# Patient Record
Sex: FEMALE | Race: WHITE | NOT HISPANIC OR LATINO | URBAN - METROPOLITAN AREA
[De-identification: names, ages, dates, MRNs, and addresses within clinical notes are randomized per-mention and may not be internally consistent; named-entity substitution may affect disease eponyms.]

---
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
## 2021-11-03 NOTE — PHYSICAL EXAM
Subjective     Chief Complaint   Patient presents with   • Follow-up       HPI    Adriel Briones is a 55 y/o F with hx of plantar fasciitis, HLD, and MARGARITA who is presenting for her annual wellness exam.     1. She notes that for the past 6 months she has been experiencing a anular pruritic rash in her inguinal area. She suspected it to be a fungal infection and has been using Clotrimazole for approximately 2 weeks each time, which clears up the lesions, but then they typically recur about 4 weeks later. She notes changing to cotton underwear without improvement. No other areas of skin affected. Notes that this appears different from her facial eczema, which improves with Hydrocortisone. Otherwise denies other skin lesions, muscle weakness, arthralgias, recent fever/chills, N/V, abdominal pain, SOB, CP.     2. HCM   - pt underwent colonoscopy 10/31/20 showing polyp of sigmoid colon with recommendation for surveillance in 5 years.  - she received her flu shot 2 weeks ago at [facility]   - she scheduled for a repeat mammogram screening on 12/27/21      I have personally reviewed and updated the following EMR sections as appropriate: Current medications, Allergies, Problem list, Past Medical History, Past Surgical History, Social History and Family History    Review of Systems   Constitutional: Negative for chills, fever and unexpected weight change.   Respiratory: Negative for shortness of breath.    Cardiovascular: Negative for chest pain, palpitations and leg swelling.   Gastrointestinal: Negative for abdominal pain, diarrhea, nausea and vomiting.   Musculoskeletal: Negative for arthralgias.   Skin: Negative.  Color change: anular pruritic rash in inguinal areas.   Neurological: Negative for weakness, numbness and headaches.       Objective     Current Outpatient Medications   Medication Sig Dispense Refill   • ketoconazole (NIZORAL) 2 % cream Apply topically 2 times daily. 15 g 0   • carbamide peroxide (Debrox)  6.5 % otic solution Place 5 drops into right ear 2 times daily. 15 mL 0     No current facility-administered medications for this visit.         Visit Vitals  /70 (BP Location: RUE - Right upper extremity, Patient Position: Sitting, Cuff Size: Regular)   Pulse 75   Temp 97.8 °F (36.6 °C) (Temporal)   Resp 16   Ht 5' 2\" (1.575 m)   Wt 68.3 kg (150 lb 11 oz)   SpO2 100%   BMI 27.56 kg/m²     Physical Exam  Constitutional:       Appearance: Normal appearance.   HENT:      Head: Normocephalic and atraumatic.   Eyes:      Extraocular Movements: Extraocular movements intact.   Cardiovascular:      Rate and Rhythm: Normal rate and regular rhythm.      Heart sounds: Normal heart sounds.   Pulmonary:      Effort: Pulmonary effort is normal. No respiratory distress.      Breath sounds: Normal breath sounds. No wheezing, rhonchi or rales.   Abdominal:      General: Bowel sounds are normal. There is no distension.      Palpations: Abdomen is soft.      Tenderness: There is no abdominal tenderness.   Musculoskeletal:         General: Normal range of motion.      Cervical back: Normal range of motion.   Skin:     General: Skin is warm and dry.      Comments: 1 cm anular erythematous macule in right inguinal area without scaling.    Neurological:      General: No focal deficit present.      Mental Status: She is alert.   Psychiatric:         Mood and Affect: Mood normal.         Behavior: Behavior normal.         Assessment and Plan      Problem List Items Addressed This Visit        Cardiac and Vasculature    Hyperlipidemia       Health Encounters    Encounter for general adult medical examination with abnormal findings - Primary       Skin    Tinea corporis     6 months of anular pruritic macules, improved with Clotrimazole use for 2 weeks, which recur 4 weeks after cessation.   Plan:   - Ketoconazole creme BID to affected areas for at least 2-4 weeks          Relevant Medications    ketoconazole (NIZORAL) 2 % cream       Other Visit Diagnoses     Vitamin D deficiency        Encounter for vitamin deficiency screening        Relevant Orders    VITAMIN D -25 HYDROXY    Screening for deficiency anemia        Relevant Orders    CBC WITH DIFFERENTIAL    Screening for lipoid disorders        Relevant Orders    LIPID PANEL WITH REFLEX    COMPREHENSIVE METABOLIC PANEL          HCM  1. Mammogram schedule in December 2. Lab work - CBC, CMP, lipid panel, Vitamin D level   3. Pt to make appointment for EHSAN Zhang,   Internal Medicine, PGY-2    Patient discussed with Dr. Marshall.     [General Appearance - Alert] : alert [General Appearance - In No Acute Distress] : in no acute distress [General Appearance - Well Nourished] : well nourished [General Appearance - Well Developed] : well developed [General Appearance - Well-Appearing] : well appearing [Attitude Uncooperative] : cooperative [Appearance Of Head] : the head was normocephalic [Facies] : there were no dysmorphic facial features [Sclera] : the conjunctiva were normal [Outer Ear] : the ears and nose were normal in appearance [Examination Of The Oral Cavity] : mucous membranes were moist and pink [Respiration, Rhythm And Depth] : normal respiratory rhythm and effort [Auscultation Breath Sounds / Voice Sounds] : breath sounds clear to auscultation bilaterally [Normal Chest Appearance] : the chest was normal in appearance [Chest Palpation Tender Sternum] : no chest wall tenderness [Apical Impulse] : quiet precordium with normal apical impulse [Heart Rate And Rhythm] : normal heart rate and rhythm [Heart Sounds] : normal S1 and S2 [Heart Sounds Gallop] : no gallops [Heart Sounds Pericardial Friction Rub] : no pericardial rub [Heart Sounds Click] : no clicks [Arterial Pulses] : normal upper and lower extremity pulses with no pulse delay [Edema] : no edema [Capillary Refill Test] : normal capillary refill [Bowel Sounds] : normal bowel sounds [Abdomen Soft] : soft [Nondistended] : nondistended [Abdomen Tenderness] : non-tender [Musculoskeletal Exam: Normal Movement Of All Extremities] : normal movements of all extremities [Musculoskeletal - Swelling] : no joint swelling seen [Musculoskeletal - Tenderness] : no joint tenderness was elicited [Nail Clubbing] : no clubbing  or cyanosis of the fingers [Motor Tone] : muscle strength and tone were normal [] : no rash [Skin Lesions] : no lesions [Skin Turgor] : normal turgor [Demonstrated Behavior - Infant Nonreactive To Parents] : interactive [Mood] : mood and affect were appropriate for age [Demonstrated Behavior] : normal behavior [FreeTextEntry1] : a systolic murmur is heard radiating to the carotids which like turbulent flow across the stented transverse aortic arch.

## 2022-08-19 ENCOUNTER — APPOINTMENT (OUTPATIENT)
Dept: PEDIATRIC CARDIOLOGY | Facility: CLINIC | Age: 22
End: 2022-08-19

## 2022-08-24 ENCOUNTER — APPOINTMENT (OUTPATIENT)
Dept: PEDIATRIC CARDIOLOGY | Facility: CLINIC | Age: 22
End: 2022-08-24

## 2022-08-24 VITALS
HEART RATE: 55 BPM | SYSTOLIC BLOOD PRESSURE: 125 MMHG | WEIGHT: 122.36 LBS | HEIGHT: 65.75 IN | OXYGEN SATURATION: 98 % | BODY MASS INDEX: 19.9 KG/M2 | DIASTOLIC BLOOD PRESSURE: 79 MMHG

## 2022-08-24 DIAGNOSIS — Q25.1 COARCTATION OF AORTA: ICD-10-CM

## 2022-08-24 DIAGNOSIS — Q23.1 CONGENITAL INSUFFICIENCY OF AORTIC VALVE: ICD-10-CM

## 2022-08-24 DIAGNOSIS — I05.9 RHEUMATIC MITRAL VALVE DISEASE, UNSPECIFIED: ICD-10-CM

## 2022-08-24 PROCEDURE — 93000 ELECTROCARDIOGRAM COMPLETE: CPT

## 2022-08-24 PROCEDURE — 93303 ECHO TRANSTHORACIC: CPT

## 2022-08-24 PROCEDURE — 93320 DOPPLER ECHO COMPLETE: CPT

## 2022-08-24 PROCEDURE — 99215 OFFICE O/P EST HI 40 MIN: CPT | Mod: 25

## 2022-08-24 PROCEDURE — 93325 DOPPLER ECHO COLOR FLOW MAPG: CPT

## 2022-08-24 NOTE — PHYSICAL EXAM
[Apical Impulse] : quiet precordium with normal apical impulse [Heart Rate And Rhythm] : normal heart rate and rhythm [Heart Sounds] : normal S1 and S2 [Heart Sounds Gallop] : no gallops [Heart Sounds Pericardial Friction Rub] : no pericardial rub [Heart Sounds Click] : no clicks [Arterial Pulses] : normal upper and lower extremity pulses with no pulse delay [Edema] : no edema [Capillary Refill Test] : normal capillary refill [Systolic] : systolic [I] : a grade 1/6  [LUSB] : LUSB [Back] : the murmur was transmitted to the back [Musculoskeletal Exam: Normal Movement Of All Extremities] : normal movements of all extremities [Musculoskeletal - Swelling] : no joint swelling seen [Musculoskeletal - Tenderness] : no joint tenderness was elicited [Skin Lesions] : no lesions [Skin Turgor] : normal turgor [General Appearance - Alert] : alert [General Appearance - In No Acute Distress] : in no acute distress [General Appearance - Well Nourished] : well nourished [General Appearance - Well Developed] : well developed [General Appearance - Well-Appearing] : well appearing [Attitude Uncooperative] : cooperative [Appearance Of Head] : the head was normocephalic [Facies] : there were no dysmorphic facial features [Sclera] : the conjunctiva were normal [Outer Ear] : the ears and nose were normal in appearance [Examination Of The Oral Cavity] : mucous membranes were moist and pink [Respiration, Rhythm And Depth] : normal respiratory rhythm and effort [Auscultation Breath Sounds / Voice Sounds] : breath sounds clear to auscultation bilaterally [Normal Chest Appearance] : the chest was normal in appearance [Chest Palpation Tender Sternum] : no chest wall tenderness [Bowel Sounds] : normal bowel sounds [Abdomen Soft] : soft [Nondistended] : nondistended [Abdomen Tenderness] : non-tender [] : no hepato-splenomegaly [Nail Clubbing] : no clubbing  or cyanosis of the fingers [Motor Tone] : muscle strength and tone were normal [Demonstrated Behavior - Infant Nonreactive To Parents] : interactive [Mood] : mood and affect were appropriate for age [Demonstrated Behavior] : normal behavior

## 2022-08-26 NOTE — REASON FOR VISIT
[Follow-Up] : a follow-up visit for [S/P Cardiac Surgery] : status post cardiac surgery [Coarctation Of The Aorta] : coarctation of the aorta [Patient] : patient [Mother] : mother [S/P Catheterization] : status post catheterization [Bicuspid Aortic Valve] : bicuspid aortic valve [FreeTextEntry3] : S/P COA Repair, Stent

## 2022-08-26 NOTE — DISCUSSION/SUMMARY
[PE + No Restrictions] : [unfilled] may participate in the entire physical education program without restriction, including all varsity competitive sports. 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 She is 2 & 1/2 years status post stent dilation with excellent results. There is no evidence of hypertension or blood pressure gradient.  We are extremely pleased with the overall results and expect no further intervention in the near future. She will need continued surveillance of her bicuspid aortic valve, ascending aorta and blood pressure. From a cardiac standpoint she will be allowed to participate in all sports including lacrosse without any restriction. She may lift weight up to 30 pounds. SBE prophylaxis is not indicated however maintaining good oral hygiene is mandatory. She will be transitioned to our adult congenital heart defect clinic after her next visit 1-2 years from now. Thank you for allowing me to participate in her care. [Needs SBE Prophylaxis] : [unfilled] does not need bacterial endocarditis prophylaxis

## 2022-08-26 NOTE — CARDIOLOGY SUMMARY
[Today's Date] : [unfilled] [FreeTextEntry1] : Sinus bradycardia at a rate of 52 bpm. RSR' 'in V1.  [FreeTextEntry2] : No aortic arch obstruction, with a peak gradient of 20 mmHg and a mean gradient of 10mmHg. Mildly dilated aortic root, sino-tubular junction and ascending aorta. The LV is normal in size and function. There is a functionally bicuspid aortic valve with no aortic insufficiency or stenosis.

## 2022-08-26 NOTE — CONSULT LETTER
[Today's Date] : [unfilled] [Name] : Name: [unfilled] [] : : ~~ [Today's Date:] : [unfilled] [Dear  ___:] : Dear Dr. [unfilled]: [Consult] : I had the pleasure of evaluating your patient, [unfilled]. My full evaluation follows. [Consult - Single Provider] : Thank you very much for allowing me to participate in the care of this patient. If you have any questions, please do not hesitate to contact me. [Sincerely,] : Sincerely, [FreeTextEntry4] : Harsha Beck MD [FreeTextEntry5] : [address] [facility] [FreeTextEntry6] : [place] [address] [de-identified] : Gail Gibson MD\par Pediatric Cardiology Fellow (PGY-5)\par [facility]\par [address], [address]\[place] [address]\par Phone: [phone]\par \par Michael Villa MD\par Congenital interventional Cardiologist\par [facility]\par , [facility]\par Telephone: [phone]\par [phone]\par \par Fax: [phone]\par \par \par \par

## 2024-01-02 ENCOUNTER — APPOINTMENT (OUTPATIENT)
Dept: ORTHOPEDIC SURGERY | Facility: CLINIC | Age: 24
End: 2024-01-02

## 2025-01-24 ENCOUNTER — APPOINTMENT (OUTPATIENT)
Dept: PEDIATRIC CARDIOLOGY | Facility: CLINIC | Age: 25
End: 2025-01-24
Payer: MEDICAID

## 2025-01-24 VITALS
HEIGHT: 65.75 IN | HEART RATE: 66 BPM | WEIGHT: 134.26 LBS | DIASTOLIC BLOOD PRESSURE: 79 MMHG | SYSTOLIC BLOOD PRESSURE: 124 MMHG | BODY MASS INDEX: 21.84 KG/M2 | OXYGEN SATURATION: 99 %

## 2025-01-24 DIAGNOSIS — Q23.81 BICUSPID AORTIC VALVE: ICD-10-CM

## 2025-01-24 PROCEDURE — 93000 ELECTROCARDIOGRAM COMPLETE: CPT

## 2025-01-24 PROCEDURE — 99215 OFFICE O/P EST HI 40 MIN: CPT | Mod: 25
